# Patient Record
(demographics unavailable — no encounter records)

---

## 2024-12-31 NOTE — PHYSICAL EXAM
[General Appearance - Well Developed] : well developed [Normal Appearance] : normal appearance [Well Groomed] : well groomed [General Appearance - Well Nourished] : well nourished [No Deformities] : no deformities [General Appearance - In No Acute Distress] : no acute distress [Normal Conjunctiva] : the conjunctiva exhibited no abnormalities [Eyelids - No Xanthelasma] : the eyelids demonstrated no xanthelasmas [Normal Oral Mucosa] : normal oral mucosa [No Oral Pallor] : no oral pallor [No Oral Cyanosis] : no oral cyanosis [Normal Jugular Venous A Waves Present] : normal jugular venous A waves present [Normal Jugular Venous V Waves Present] : normal jugular venous V waves present [No Jugular Venous Reyes A Waves] : no jugular venous reyes A waves [Respiration, Rhythm And Depth] : normal respiratory rhythm and effort [Exaggerated Use Of Accessory Muscles For Inspiration] : no accessory muscle use [Auscultation Breath Sounds / Voice Sounds] : lungs were clear to auscultation bilaterally [Abdomen Soft] : soft [Abdomen Tenderness] : non-tender [Abdomen Mass (___ Cm)] : no abdominal mass palpated [Abnormal Walk] : normal gait [Gait - Sufficient For Exercise Testing] : the gait was sufficient for exercise testing [Nail Clubbing] : no clubbing of the fingernails [Cyanosis, Localized] : no localized cyanosis [Petechial Hemorrhages (___cm)] : no petechial hemorrhages [Skin Color & Pigmentation] : normal skin color and pigmentation [] : no rash [No Venous Stasis] : no venous stasis [Skin Lesions] : no skin lesions [No Skin Ulcers] : no skin ulcer [No Xanthoma] : no  xanthoma was observed [Oriented To Time, Place, And Person] : oriented to person, place, and time [Affect] : the affect was normal [Mood] : the mood was normal [No Anxiety] : not feeling anxious

## 2024-12-31 NOTE — HISTORY OF PRESENT ILLNESS
[FreeTextEntry1] : Justice Mike is a very pleasant 66-year-old gentleman with history of hypertension, diabetes, dyslipidemia, CKD, obesity, asthma, suspect sleep apnea syndrome came for cardiac  follow up.  He was admitted to [facility] on November 11, 2024 for acute on chronic kidney failure; in the hospital he was initiated to have hemodialysis.  He was also found to have complete heart block and underwent Micra pacemaker.  Today he is here for cardiac  follow  His walking has improved  ; he denies PND, orthopnea, diaphoresis, dizziness, palpitations, claudications.  9 status post fistula placement in left elbow, so far it has not been used, he has appointment with vascular surgeon.  October 17, 2024   pacemaker interaction confirmed normal functioning pacemaker  Rate 12/2024 MRI scan is negative for amyloidosis January 30, 2024   echocardiogram confirmed LV function 65 to 70%  January 12, 2024   echocardiogram confirmed LVEF 65 to 70%. January 17, 2024   repeat echo post Micra placement confirmed LVEF 65 to 70% without pericardial effusion January 25, 2024    CBC normal; BMP shows sodium 136 potassium 4.3 chloride 100 CO2 25, BUN 43, creatinine 5.1, SGOT 95 SGPT 59   CAD RISK FACTORS: His CAD risk factors include age, male sex, diabetes, hypertension and high cholesterol.  Denies smoking and denies significant family history of premature coronary artery disease.  He has been adopted.

## 2024-12-31 NOTE — ASSESSMENT
[FreeTextEntry1] : Shortness of breath  -echo confirmed normal LVEF; now he is s/p Micra pacemaker.  Rule out CAD.  I requested exercise nuclear stress test to see inducible ischemia  Echocardiogram confirmed findings suggestive of amyloid  -amyloid scan negative for amyloidosis  Controlled diabetes. Long-term goals of blood pressure less than 130/80, A1c less than 7, and LDL less than 70; diabetic diet; continue current medications. Follow with endocrinologist.  A1c 5.9 on March 21, 2023  Hypertension- controlled. Low-salt diet, continue hydralazine, BP monitoring.  Continue Norvasc 2.5 mg daily and hydralazine with low-salt diet  ESRD -on hemodialysis  Morbid obesity. S/p bariatric surgery and he lost 50 pounds, low-calorie diet has been discussed with him  Dyslipidemia. Low-cholesterol diet.  Can Lipitor 80 mg daily.  Decreased peripheral pulses both dorsalis pedis and anterior tibial bilaterally -I recommended MEGAN  Aggressive risk factor modification has been discussed with him at a great length. He will be reevaluated by me after MEGAN

## 2025-01-23 NOTE — ASSESSMENT
[FreeTextEntry1] : Shortness of breath  -echo confirmed normal LVEF; now he is s/p Micra pacemaker.  Rule out CAD.    Echocardiogram confirmed findings suggestive of amyloid  -amyloid scan negative for amyloidosis  Controlled diabetes. Long-term goals of blood pressure less than 130/80, A1c less than 7, and LDL less than 70; diabetic diet; continue current medications. Follow with endocrinologist.  A1c 5.9 on March 21, 2023  Hypertension- controlled. Low-salt diet, continue hydralazine, BP monitoring.  Continue Norvasc 2.5 mg daily and hydralazine with low-salt diet  ESRD -on hemodialysis  Morbid obesity. S/p bariatric surgery and he lost 50 pounds, low-calorie diet has been discussed with him  Dyslipidemia. Low-cholesterol diet.  Can Lipitor 80 mg daily.  PAD -walking program has been recommended to him  Aggressive risk factor modification has been discussed with him at a great length. He will be reevaluated by me after MEGAN in 6 months along with lab testing

## 2025-01-23 NOTE — HISTORY OF PRESENT ILLNESS
[FreeTextEntry1] : Justice Mike is a very pleasant 66-year-old gentleman with history of hypertension, diabetes, dyslipidemia, ESRD on hemodialysis, obesity status post gastric sleeve procedure, asthma, suspect sleep apnea syndrome, complete heart block and underwent Micra pacemaker came for cardiac  follow up.     His walking has improved  ; he denies PND, orthopnea, diaphoresis, dizziness, palpitations, claudications.  9 status post fistula placement in left elbow, so far it has not been used, he has appointment with vascular surgeon.  January 21, 2025   MEGAN confirmed severely decreased bilateral toe indexes and mild decrease MEGAN on left side, right side was noncompressible arteries October 17, 2024   pacemaker interaction confirmed normal functioning pacemaker Rate 12/2024PYP scan is negative for amyloidosis January 30, 2024   echocardiogram confirmed LV function 65 to 70%  January 17, 2024   repeat echo post Micra placement confirmed LVEF 65 to 70% without pericardial effusion January 25, 2024    CBC normal; BMP shows sodium 136 potassium 4.3 chloride 100 CO2 25, BUN 43, creatinine 5.1, SGOT 95 SGPT 59   CAD RISK FACTORS: His CAD risk factors include age, male sex, diabetes, hypertension and high cholesterol.  Denies smoking and denies significant family history of premature coronary artery disease.  He has been adopted.

## 2025-02-18 NOTE — HISTORY OF PRESENT ILLNESS
[FreeTextEntry1] : 65 year old male presents for a follow up visit. Pt received a kidney transplant on 2/5/25 at [facility] with Dr. Watson. Pt denies any difficulties post operatively and is feeling well with a small decline in appetite. Pt states he feels weak and fatigued with normal urination. Urination 1-2x during the night. Pt denies any nausea or muscle cramps at this time.   Sugar levels 150-154 in the morning and 140 in the afternoon. Pt states he has gained some weight since last visit while staying on a good diet. Denies any abdominal pain. No recent bloodwork, last bloodwork done 2/5/25.  Review of systems is negative for chest pain, shortness of breath, nausea vomiting, abdominal pain, or diarrhea.  Advised to watch sugar levels with new kidney. pt is having some pain with the post operative swelling, rec. to take his oxy when needed. Increasing Insulin Lantus to 15 units at night. Humalog before meals at 15 units. Pt to check sugars am/pm and to record readings. Pt needs to follow with Opthamologist.

## 2025-02-18 NOTE — REVIEW OF SYSTEMS
[Fatigue] : fatigue [Decreased Appetite] : decreased appetite [Recent Weight Gain (___ Lbs)] : recent weight gain: [unfilled] lbs [Fever] : no fever [Chills] : no chills [Nausea] : no nausea [Abdominal Pain] : no abdominal pain [Vomiting] : no vomiting [Diarrhea] : no diarrhea [Polyuria] : no polyuria [Dysuria] : no dysuria [Nocturia] : nocturia [Joint Pain] : no joint pain [Joint Stiffness] : no joint stiffness [Headaches] : no headaches [Dizziness] : no dizziness [Confusion] : no confusion [Tremors] : no tremors [Pain/Numbness of Digits] : no pain/numbness of digits [Poor Balance] : steady state balance [Polydipsia] : no polydipsia [Negative] : Heme/Lymph [FreeTextEntry7] : Mild swelling in his scar on the left lower quadrant slightly tender to touch.

## 2025-02-18 NOTE — PHYSICAL EXAM
[Alert] : alert [Well Nourished] : well nourished [Healthy Appearance] : healthy appearance [No Acute Distress] : no acute distress [Well Developed] : well developed [Normal Sclera/Conjunctiva] : normal sclera/conjunctiva [EOMI] : extra ocular movement intact [No Proptosis] : no proptosis [Normal Oropharynx] : the oropharynx was normal [Thyroid Not Enlarged] : the thyroid was not enlarged [No Thyroid Nodules] : no palpable thyroid nodules [No Respiratory Distress] : no respiratory distress [No Accessory Muscle Use] : no accessory muscle use [Clear to Auscultation] : lungs were clear to auscultation bilaterally [Normal S1, S2] : normal S1 and S2 [Normal Rate] : heart rate was normal [Regular Rhythm] : with a regular rhythm [No Edema] : no peripheral edema [Normal Bowel Sounds] : normal bowel sounds [Soft] : abdomen soft [No HSM] : no hepato-splenomegaly [Normal Supraclavicular Nodes] : no supraclavicular lymphadenopathy [Normal Anterior Cervical Nodes] : no anterior cervical lymphadenopathy [Normal Posterior Cervical Nodes] : no posterior cervical lymphadenopathy [No CVA Tenderness] : no ~M costovertebral angle tenderness [No Spinal Tenderness] : no spinal tenderness [Spine Straight] : spine straight [No Stigmata of Cushings Syndrome] : no stigmata of Cushings Syndrome [Normal Gait] : normal gait [No Joint Swelling] : no joint swelling seen [Normal Strength/Tone] : muscle strength and tone were normal [No Rash] : no rash [No Skin Lesions] : no skin lesions [Acanthosis Nigricans] : no acanthosis nigricans [Foot Ulcers] : no foot ulcers [No Motor Deficits] : the motor exam was normal [No Sensory Deficits] : the sensory exam was normal to light touch and pinprick [Normal Reflexes] : deep tendon reflexes were 2+ and symmetric [No Tremors] : no tremors [Oriented x3] : oriented to person, place, and time [de-identified] : Patient BMI is at 29.35 [de-identified] : Small systolic murmur over the left sternal border [de-identified] : Diminished distal pedal pulses [de-identified] : Scar of recent surgery of the left lower quadrant slight local edema and tender to touch

## 2025-02-18 NOTE — ASSESSMENT
[Diabetes Foot Care] : diabetes foot care [Long Term Vascular Complications] : long term vascular complications of diabetes [Carbohydrate Consistent Diet] : carbohydrate consistent diet [Importance of Diet and Exercise] : importance of diet and exercise to improve glycemic control, achieve weight loss and improve cardiovascular health [Exercise/Effect on Glucose] : exercise/effect on glucose [Hypoglycemia Management] : hypoglycemia management [Retinopathy Screening] : Patient was referred to ophthalmology for retinopathy screening [FreeTextEntry1] : Young -American male with a past medical history of type 2 diabetes end-stage renal disease, hypertension, peripheral arterial disease and hyper lipidemia recently underwent a kidney transplant which was performed at the [facility] without any complications.  Patient is now urinating as usual and is not on dialysis anymore.  His glucose levels in the morning are slightly elevated ranging up to 160 mg per DL.  He does have peripheral arterial disease hyperlipidemia and hypertension for which she is currently taking hydralazine, Norvasc, atorvastatin, baby aspirin and Zetia.  Recommendation 1.  I have advised the patient to increase the dose of the insulin Lantus to 15 units every day at night.  The goal of therapy is to have a fasting blood glucose level between 80 to 130 mg per DL 2.  Patient will continue with the Humalog insulin 10 to 12 units 3 times a day before meals 3.  The importance of a strict low carbohydrate and low-fat diet was stressed upon the patient and also the need for regular exercise regimen with walking was discussed with the patient 4.  I am also referring the patient for a complete blood analysis 5.  If clinically stable then the patient will follow-up in 3 months time.  Patient is encouraged to monitor his sugar levels on a regular basis and to maintain a logbook which he will bring on his next office visit.  The plan discussed with the patient 6.  Patient will follow-up with his nephrologist on a routine basis for monitoring of his kidney function.

## 2025-05-09 NOTE — PHYSICAL EXAM
[Alert] : alert [Well Nourished] : well nourished [No Acute Distress] : no acute distress [Well Developed] : well developed [Normal Sclera/Conjunctiva] : normal sclera/conjunctiva [EOMI] : extra ocular movement intact [No Proptosis] : no proptosis [Normal Outer Ear/Nose] : the ears and nose were normal in appearance [Normal Oropharynx] : the oropharynx was normal [No Neck Mass] : no neck mass was observed [Thyroid Not Enlarged] : the thyroid was not enlarged [No Thyroid Nodules] : no palpable thyroid nodules [No Respiratory Distress] : no respiratory distress [No Accessory Muscle Use] : no accessory muscle use [Clear to Auscultation] : lungs were clear to auscultation bilaterally [Normal S1, S2] : normal S1 and S2 [Normal Rate] : heart rate was normal [Regular Rhythm] : with a regular rhythm [No Edema] : no peripheral edema [Normal Bowel Sounds] : normal bowel sounds [Not Tender] : non-tender [Not Distended] : not distended [Soft] : abdomen soft [No HSM] : no hepato-splenomegaly [Normal Supraclavicular Nodes] : no supraclavicular lymphadenopathy [Normal Anterior Cervical Nodes] : no anterior cervical lymphadenopathy [Normal Posterior Cervical Nodes] : no posterior cervical lymphadenopathy [No Spinal Tenderness] : no spinal tenderness [Spine Straight] : spine straight [No Stigmata of Cushings Syndrome] : no stigmata of Cushings Syndrome [Normal Gait] : normal gait [No Clubbing, Cyanosis] : no clubbing  or cyanosis of the fingernails [No Joint Swelling] : no joint swelling seen [Normal Strength/Tone] : muscle strength and tone were normal [No Rash] : no rash [No Skin Lesions] : no skin lesions [Acanthosis Nigricans] : no acanthosis nigricans [No Motor Deficits] : the motor exam was normal [No Sensory Deficits] : the sensory exam was normal to light touch and pinprick [Normal Reflexes] : deep tendon reflexes were 2+ and symmetric [No Tremors] : no tremors [Oriented x3] : oriented to person, place, and time [de-identified] : Patient BMI is at 29.35 and his weight is 193 pounds [de-identified] : Soft systolic murmur on the aortic area [de-identified] : Small carotid bruit on the right side, decreased distal pulses in the ankles

## 2025-05-09 NOTE — HISTORY OF PRESENT ILLNESS
[FreeTextEntry1] : 66-year-old male with a medical hx of DM2 present for a routine follow up visit. Patient states that he recently had a hypoglycemic episode which he was treated at [facility]. Patient states that he was on his way to work during the episode, he was able to safely pull over and call 911 before he passed out. His glucose decreased down to the 40's. His most recent blood work 4/16/25 results a glucose level of 98, and a A1C level of 5.0. His morning glucose numbers range from the 80s-100 mg dL. Patient does have a past medical hx of CKD which he did undergo a kidney transplant He is compliant with following up with his nephrologist. He eats 2-3 small meals a day consisting mostly of meats, advise patient to intake more carbs. His circulation of the lower extremities remain stable bilateral, denies any paresthesia. His vision remains stable. Patient is quite active and still works. Review of systems otherwise is negative  d/c humalog insuling and begin taking repaglinide

## 2025-05-09 NOTE — REVIEW OF SYSTEMS
[Fatigue] : fatigue [Decreased Appetite] : appetite not decreased [Dysphagia] : no dysphagia [Dysphonia] : no dysphonia [Chest Pain] : no chest pain [Palpitations] : no palpitations [Shortness Of Breath] : no shortness of breath [Nausea] : no nausea [Constipation] : no constipation [Vomiting] : no vomiting [Diarrhea] : no diarrhea [Polyuria] : no polyuria [Dysuria] : no dysuria [Nocturia] : nocturia [Pain/Numbness of Digits] : pain/numbness of digits [Negative] : Heme/Lymph

## 2025-05-09 NOTE — ASSESSMENT
[Diabetes Foot Care] : diabetes foot care [Long Term Vascular Complications] : long term vascular complications of diabetes [Carbohydrate Consistent Diet] : carbohydrate consistent diet [Importance of Diet and Exercise] : importance of diet and exercise to improve glycemic control, achieve weight loss and improve cardiovascular health [Hypoglycemia Management] : hypoglycemia management [Self Monitoring of Blood Glucose] : self monitoring of blood glucose [Retinopathy Screening] : Patient was referred to ophthalmology for retinopathy screening [FreeTextEntry1] : Young -American male with a past medical history of a type 2 diabetes end-stage renal disease status post kidney transplant, hypertension peripheral vascular disease who recently was admitted due to hypoglycemia.  Patient apparently took the insulin but he did not eat enough and mostly protein without any starches.  His blood glucose had fallen down to the low 40s and was admitted to the hospital for observation.  Patient currently is taking insulin Lantus 15 units at nighttime and Humalog insulin depending upon her sliding scale.  Patient otherwise is clinically stable.  Recommendation 1.  I have advised the patient to discontinue the Humalog prior to the meals and I am starting him on repaglinide 0.5 mg tablet 3 times a day.  Benefits and side effects were explained to the patient.  Patient will take it before any major meal to a maximum of 3 times a day. 2.  We will discontinue the Humalog but the patient will continue with the Lantus 15 units at nighttime. 3.  Patient also was explained about the signs and symptoms of hypoglycemia and its management was explained.  Patient will be prescribed G-voke prefilled glucagon pen which the patient will use in case of emergency. 4.  The importance of a regular diet with sufficient amount of starches in the meal were discussed with the patient. 5.  If the patient remains stable he will follow-up in 3 months time.  Patient recently had a blood test in April of this year and he is here with an A1c was 5.0%, BUN was 29 creatinine 1.58 and estimated GFR was 48. 6.  Patient was explained in detail the results of the blood test.

## 2025-07-02 NOTE — HISTORY OF PRESENT ILLNESS
[FreeTextEntry1] : Justice Mike is a very pleasant 66-year-old gentleman with history of hypertension, diabetes, dyslipidemia, ESRD -s/p renal transplant, obesity status post gastric sleeve procedure, asthma, suspect sleep apnea syndrome, complete heart block and underwent Micra pacemaker came for cardiac testing follow up.      July 2, 2025         MEGAN confirm index 0.48 on right side and 0.9 on left side, TBI 0.48 on right side and 0.22 on left side; minimal improvement His walking has improved  ; he denies PND, orthopnea, diaphoresis, dizziness, palpitations, claudications.  9 status post fistula placement in left elbow, so far it has not been used, he has appointment with vascular surgeon.  January 21, 2025   MEGAN confirmed severely decreased bilateral toe indexes and mild decrease MEGAN on left side, right side was noncompressible arteries October 17, 2024   pacemaker interaction confirmed normal functioning pacemaker Rate 12/2024PYP scan is negative for amyloidosis January 30, 2024   echocardiogram confirmed LV function 65 to 70%  January 17, 2024   repeat echo post Micra placement confirmed LVEF 65 to 70% without pericardial effusion January 25, 2024    CBC normal; BMP shows sodium 136 potassium 4.3 chloride 100 CO2 25, BUN 43, creatinine 5.1, SGOT 95 SGPT 59   CAD RISK FACTORS: His CAD risk factors include age, male sex, diabetes, hypertension and high cholesterol.  Denies smoking and denies significant family history of premature coronary artery disease.  He has been adopted.

## 2025-07-02 NOTE — ASSESSMENT
[FreeTextEntry1] : Shortness of breath  -echo confirmed normal LVEF; now he is s/p Micra pacemaker.  Rule out CAD.    Echocardiogram confirmed findings suggestive of amyloid  -amyloid scan negative for amyloidosis.  Will repeat echocardiac for evaluation of LVEF.  Controlled diabetes. Long-term goals of blood pressure less than 130/80, A1c less than 7, and LDL less than 70; diabetic diet; continue current medications. Follow with endocrinologist.  A1c 5.9 on March 21, 2023  Hypertension- controlled. Low-salt diet, continue hydralazine, BP monitoring.  Continue Norvasc 2.5 mg daily and hydralazine with low-salt diet  ESRD -on hemodialysis  Morbid obesity. S/p bariatric surgery and he lost 50 pounds, low-calorie diet has been discussed with him  Dyslipidemia. Low-cholesterol diet.  Can Lipitor 80 mg daily.  PAD -walking program has been recommended to him.  Will repeat MEGAN in 6 months.  Aggressive risk factor modification has been discussed with him at a great length. He will be reevaluated by me after MEGAN in 6 months along with lab testing.